# Patient Record
Sex: MALE | Race: WHITE | NOT HISPANIC OR LATINO | Employment: OTHER | URBAN - METROPOLITAN AREA
[De-identification: names, ages, dates, MRNs, and addresses within clinical notes are randomized per-mention and may not be internally consistent; named-entity substitution may affect disease eponyms.]

---

## 2024-09-05 ENCOUNTER — OFFICE VISIT (OUTPATIENT)
Dept: FAMILY MEDICINE | Facility: CLINIC | Age: 69
End: 2024-09-05
Payer: MEDICARE

## 2024-09-05 VITALS
BODY MASS INDEX: 30.79 KG/M2 | TEMPERATURE: 97 F | RESPIRATION RATE: 18 BRPM | HEART RATE: 75 BPM | HEIGHT: 69 IN | DIASTOLIC BLOOD PRESSURE: 72 MMHG | OXYGEN SATURATION: 96 % | SYSTOLIC BLOOD PRESSURE: 104 MMHG | WEIGHT: 207.88 LBS

## 2024-09-05 DIAGNOSIS — M62.89 QUADRICEP TIGHTNESS: Primary | ICD-10-CM

## 2024-09-05 PROCEDURE — 3078F DIAST BP <80 MM HG: CPT | Mod: CPTII,S$GLB,, | Performed by: INTERNAL MEDICINE

## 2024-09-05 PROCEDURE — 3008F BODY MASS INDEX DOCD: CPT | Mod: CPTII,S$GLB,, | Performed by: INTERNAL MEDICINE

## 2024-09-05 PROCEDURE — 1101F PT FALLS ASSESS-DOCD LE1/YR: CPT | Mod: CPTII,S$GLB,, | Performed by: INTERNAL MEDICINE

## 2024-09-05 PROCEDURE — 3074F SYST BP LT 130 MM HG: CPT | Mod: CPTII,S$GLB,, | Performed by: INTERNAL MEDICINE

## 2024-09-05 PROCEDURE — 1125F AMNT PAIN NOTED PAIN PRSNT: CPT | Mod: CPTII,S$GLB,, | Performed by: INTERNAL MEDICINE

## 2024-09-05 PROCEDURE — 99999 PR PBB SHADOW E&M-NEW PATIENT-LVL III: CPT | Mod: PBBFAC,,, | Performed by: INTERNAL MEDICINE

## 2024-09-05 PROCEDURE — 99203 OFFICE O/P NEW LOW 30 MIN: CPT | Mod: S$GLB,,, | Performed by: INTERNAL MEDICINE

## 2024-09-05 PROCEDURE — 1159F MED LIST DOCD IN RCRD: CPT | Mod: CPTII,S$GLB,, | Performed by: INTERNAL MEDICINE

## 2024-09-05 PROCEDURE — 3288F FALL RISK ASSESSMENT DOCD: CPT | Mod: CPTII,S$GLB,, | Performed by: INTERNAL MEDICINE

## 2024-09-05 RX ORDER — TIZANIDINE 4 MG/1
4 TABLET ORAL EVERY 6 HOURS PRN
Qty: 30 TABLET | Refills: 0 | Status: SHIPPED | OUTPATIENT
Start: 2024-09-05 | End: 2024-09-15

## 2024-09-05 RX ORDER — PRAVASTATIN SODIUM 40 MG/1
40 TABLET ORAL DAILY
COMMUNITY
Start: 2008-09-01

## 2024-09-05 RX ORDER — LISINOPRIL 40 MG/1
40 TABLET ORAL DAILY
COMMUNITY
Start: 2008-09-01

## 2024-09-05 NOTE — PROGRESS NOTES
Patient ID: Shalom Lizarraga is a 68 y.o. male.    Chief Complaint: Pain (Pain variates with activity/Started with back pain which started a week ago Tuesday /Not so much of the back pain but has pain from thighs on down. /Denies N/V )      History of Present Illness    The patient presents with severe leg pain that worsens with certain movements, particularly when bending over or transitioning between sitting and standing positions.    Mr. Lizarraga reports severe leg pain that began 9 days ago after straining his back while returning a propane grill to [facility]. The initial back pain persisted intermittently for 5-6 days. 5 days after the incident, the patient noticed a slight tingling sensation in his legs, initially attributed to an electrolyte imbalance due to heat. By day 6, the leg pain intensified and did not resolve with potassium, electrolyte drinks, or bananas. On day 7, the pain became significantly more severe, limiting daily activities and prompting the patient to take his wife's post-surgery pain medication.    The leg pain is sharp and severe, particularly when bending over, rising from a chair, or sitting down. It is localized primarily in the quadriceps area and exacerbated when the legs are positioned higher than the hips. Walking does not cause as much pain, but transitioning from standing to sitting is particularly painful. The patient reports difficulty bending over without significant pain.    The back pain has resolved, but the leg pain persists. The patient can sit comfortably with his legs at a 90-degree angle but has pain when his legs are elevated or when using his quadriceps muscles. The pain is severe enough to almost cause falls, not due to muscle weakness but because of its intensity.    Today, the patient was unable to bend down low enough to put a harness on their dachshund, a task he could manage yesterday, indicating progression in movement limitation.    Prior to the visit, the patient took  hydrocodone 10/325 mg, naproxen 500 mg, and cyclobenzaprine 5 mg to manage the pain.    The patient denies any current back pain, muscle weakness, or coordination issues when walking.      ROS:  General: denies fever, denies chills, denies fatigue, denies weight gain, denies weight loss  Eyes: denies vision changes, denies redness, denies discharge  ENT: denies ear pain, denies nasal congestion, denies sore throat  Cardiovascular: denies chest pain, denies palpitations, denies lower extremity edema  Respiratory: denies cough, denies shortness of breath  Gastrointestinal: denies abdominal pain, denies nausea, denies vomiting, denies diarrhea, denies constipation, denies blood in stool  Genitourinary: denies dysuria, denies hematuria, denies frequency  Musculoskeletal: complains of joint pain, denies muscle pain, complains of joint stiffness, denies back pain, denies muscle weakness  Skin: denies rash, denies lesion  Neurological: denies headache, denies dizziness, denies numbness, denies tingling  Psychiatric: denies anxiety, denies depression, denies sleep difficulty            Physical Exam    General: In no acute distress.  Head: Normocephalic. Non traumatic.  Eyes: PERRLA. EOMs full. Conjunctivae clear. Fundi grossly normal.  Ears: EACs clear. TMs normal.  Nose: Mucosa pink. Mucosa moist. No obstruction.  Throat: Clear. No exudates. No lesions.  Neck: Supple. No masses. No thyromegaly. No bruits.  Chest: Lungs clear. No rales. No rhonchi. No wheezes.  Heart: RRR. No murmurs. No rubs. No gallops.  Abdomen: Soft. No tenderness. No masses. BS normal.  : Normal external genitalia. No lesions. No discharge. No hernias  noted.  Back: Normal curvature. No scoliosis. No tenderness.  Extremities: Warm. Well perfused. No upper extremity edema. No lower extremity edema. FROM. No deformities. No joint erythema.  Neuro: No focal deficits appreciated. Good muscle tone. Normal response to visual stimuli. Normal response to  "auditory stimuli.  Skin: Normal. No rashes. No lesions noted.  Musculoskeletal: PAIN WITH CONTRACTION OF GLUTEAL MUSCLES.            Current Outpatient Medications:     lisinopriL (PRINIVIL,ZESTRIL) 40 MG tablet, Take 40 mg by mouth once daily., Disp: , Rfl:     pravastatin (PRAVACHOL) 40 MG tablet, Take 40 mg by mouth once daily., Disp: , Rfl:     tiZANidine (ZANAFLEX) 4 MG tablet, Take 1 tablet (4 mg total) by mouth every 6 (six) hours as needed., Disp: 30 tablet, Rfl: 0            VITAL SIGNS:  Vitals:    09/05/24 1325   BP: 104/72   BP Location: Left arm   Patient Position: Sitting   BP Method: Medium (Manual)   Pulse: 75   Resp: 18   Temp: 97.3 °F (36.3 °C)   TempSrc: Temporal   SpO2: 96%   Weight: 94.3 kg (207 lb 14.3 oz)   Height: 5' 9" (1.753 m)       CURRENT BMI:   Body mass index is 30.7 kg/m².    LABS REVIEWED:    CBC:  No results found for: "WBC", "RBC", "HGB", "HCT", "MCV", "MCH", "MCHC", "RDW", "PLT", "MPV", "GRAN", "LYMPH", "MONO", "EOS", "BASO", "EOSINOPHIL", "BASOPHIL"    CHEMISTRY:  No results found for: "NA", "K", "CL", "CO2", "GLU", "BUN", "CREATININE", "CALCIUM", "PROT", "ALBUMIN", "BILITOT", "ALKPHOS", "AST", "ALT", "ANIONGAP", "EGFRNORACEVR"    LIPID PANEL:  No results found for: "CHOL"  No results found for: "TRIG"  No results found for: "HDL"  No results found for: "LDLCALC"    THYROID:  No results found for: "TSH", "E5PIMRJ", "M6EWIMN", "THYROIDAB", "FREET4"    DIABETES:  No results found for: "LABA1C", "HGBA1C"  No results found for: "MICALBCREAT"    Assessment and Plan     Assessment & Plan    LEG PAIN:  - Assessed leg pain, noting full strength and coordinated gait.  - Ruled out bulging disc or neurological issues based on physical exam.  - Determined pain likely due to muscle strain from compensatory movements during recent back pain episode.  - Explained muscle pain feedback loop: pain causes muscle tightening, which perpetuates pain cycle.  - Discussed role of specific muscle groups " (glutes, quads, piriformis) in standing and sitting movements.  - Explained how slight changes in body mechanics can lead to muscle strain.    MEDICATIONS/SUPPLEMENTS:  - Will switch muscle relaxer from cyclobenzaprine to tizanidine (Zanaflex) for more targeted muscle relaxation.  - Anticipated 2-day period for medication effectiveness.  - Considered potential for steroid treatment if muscle relaxer proves ineffective.  - Started tizanidine (Zanaflex) as muscle relaxer.  - Continued naproxen 500 mg and hydrocodone/acetaminophen 10/325 mg.  - Discontinued cyclobenzaprine 5 mg.    FOLLOW UP:  - Contact the office by Monday if no improvement in symptoms.          1. Quadricep tightness  Comments:  Will prescribe Zanaflex.  Orders:  -     tiZANidine (ZANAFLEX) 4 MG tablet; Take 1 tablet (4 mg total) by mouth every 6 (six) hours as needed.  Dispense: 30 tablet; Refill: 0           No follow-ups on file.  32 of total time spent on the encounter, which includes face to face time and non-face to face time preparing to see the patient. This includes obtaining and/or reviewing separately obtained history, performing a medically appropriate examination and/or evaluation, and counseling and educating the patient/family/caregiver. Includes documenting clinical information in the electronic or other health record, independently interpreting results (not separately reported) and communicating results to the patient/family/caregiver, with care coordination (not separately reported). Medications, tests and/or procedures ordered as necessary along with referring and communicating with other health professionals (when not separately reported).      This note was generated with the assistance of ambient listening technology. Verbal consent was obtained by the patient and accompanying visitor(s) for the recording of patient appointment to facilitate this note. I attest to having reviewed and edited the generated note for accuracy,  though some syntax or spelling errors may persist. Please contact the author of this note for any clarification.

## 2024-09-09 ENCOUNTER — TELEPHONE (OUTPATIENT)
Dept: INTERNAL MEDICINE | Facility: CLINIC | Age: 69
End: 2024-09-09
Payer: MEDICARE

## 2024-09-09 ENCOUNTER — PATIENT MESSAGE (OUTPATIENT)
Dept: FAMILY MEDICINE | Facility: CLINIC | Age: 69
End: 2024-09-09
Payer: MEDICARE

## 2024-09-09 RX ORDER — METHYLPREDNISOLONE 4 MG/1
TABLET ORAL
Qty: 21 EACH | Refills: 0 | Status: SHIPPED | OUTPATIENT
Start: 2024-09-09

## 2024-09-09 NOTE — TELEPHONE ENCOUNTER
"Message below from Dr Gutierrez via secure chat    "this person is scheduled with me for Wednesday. pt should contact his pcp today if continuing with symptoms for direction on next steps. appear she saw him last week and her notes indicate she wanted to hear from him by Monday if continuing with symptoms. "    Pt states that he did contact his PCP today but would like to keep his appointment for Wednesday.   "

## 2024-09-12 ENCOUNTER — OFFICE VISIT (OUTPATIENT)
Dept: INTERNAL MEDICINE | Facility: CLINIC | Age: 69
End: 2024-09-12
Payer: MEDICARE

## 2024-09-12 ENCOUNTER — HOSPITAL ENCOUNTER (OUTPATIENT)
Dept: RADIOLOGY | Facility: HOSPITAL | Age: 69
Discharge: HOME | End: 2024-09-12
Attending: INTERNAL MEDICINE
Payer: MEDICARE

## 2024-09-12 VITALS
HEART RATE: 68 BPM | OXYGEN SATURATION: 98 % | TEMPERATURE: 97 F | DIASTOLIC BLOOD PRESSURE: 80 MMHG | HEIGHT: 69 IN | WEIGHT: 210.13 LBS | BODY MASS INDEX: 31.12 KG/M2 | SYSTOLIC BLOOD PRESSURE: 140 MMHG

## 2024-09-12 DIAGNOSIS — M62.89 QUADRICEP TIGHTNESS: ICD-10-CM

## 2024-09-12 DIAGNOSIS — M54.16 LUMBAR RADICULITIS: Primary | ICD-10-CM

## 2024-09-12 DIAGNOSIS — M54.16 LUMBAR RADICULITIS: ICD-10-CM

## 2024-09-12 PROCEDURE — 3077F SYST BP >= 140 MM HG: CPT | Mod: CPTII,S$GLB,, | Performed by: INTERNAL MEDICINE

## 2024-09-12 PROCEDURE — 99999 PR PBB SHADOW E&M-EST. PATIENT-LVL IV: CPT | Mod: PBBFAC,,, | Performed by: INTERNAL MEDICINE

## 2024-09-12 PROCEDURE — 72100 X-RAY EXAM L-S SPINE 2/3 VWS: CPT | Mod: 26,,, | Performed by: RADIOLOGY

## 2024-09-12 PROCEDURE — 1159F MED LIST DOCD IN RCRD: CPT | Mod: CPTII,S$GLB,, | Performed by: INTERNAL MEDICINE

## 2024-09-12 PROCEDURE — 3008F BODY MASS INDEX DOCD: CPT | Mod: CPTII,S$GLB,, | Performed by: INTERNAL MEDICINE

## 2024-09-12 PROCEDURE — 3079F DIAST BP 80-89 MM HG: CPT | Mod: CPTII,S$GLB,, | Performed by: INTERNAL MEDICINE

## 2024-09-12 PROCEDURE — 1125F AMNT PAIN NOTED PAIN PRSNT: CPT | Mod: CPTII,S$GLB,, | Performed by: INTERNAL MEDICINE

## 2024-09-12 PROCEDURE — 99214 OFFICE O/P EST MOD 30 MIN: CPT | Mod: S$GLB,,, | Performed by: INTERNAL MEDICINE

## 2024-09-12 PROCEDURE — 72100 X-RAY EXAM L-S SPINE 2/3 VWS: CPT | Mod: TC

## 2024-09-12 PROCEDURE — 3288F FALL RISK ASSESSMENT DOCD: CPT | Mod: CPTII,S$GLB,, | Performed by: INTERNAL MEDICINE

## 2024-09-12 PROCEDURE — 1101F PT FALLS ASSESS-DOCD LE1/YR: CPT | Mod: CPTII,S$GLB,, | Performed by: INTERNAL MEDICINE

## 2024-09-12 RX ORDER — TIZANIDINE 4 MG/1
4 TABLET ORAL EVERY 6 HOURS PRN
Qty: 30 TABLET | Refills: 0 | Status: SHIPPED | OUTPATIENT
Start: 2024-09-12 | End: 2024-09-22

## 2024-09-12 RX ORDER — GABAPENTIN 100 MG/1
CAPSULE ORAL
Qty: 90 CAPSULE | Refills: 0 | Status: SHIPPED | OUTPATIENT
Start: 2024-09-12

## 2024-09-12 NOTE — PROGRESS NOTES
"Subjective:      Patient ID: Shalom Lizarraga is a 68 y.o. male.    Chief Complaint: Establish Care    HPI    69 yo with There is no problem list on file for this patient.    Past Medical History:   Diagnosis Date    Hyperlipidemia     Hypertension      Here today c/o leg pain that began assoc with back pain suspected due to strain when lifting a propane tank.    He saw a primary care provider on 9/5.    9/5/24 history reviewed with patient:  "The patient presents with severe leg pain that worsens with certain movements, particularly when bending over or transitioning between sitting and standing positions.     Mr. Lizarraga reports severe leg pain that began 9 days ago after straining his back while returning a propane grill to [facility]. The initial back pain persisted intermittently for 5-6 days. 5 days after the incident, the patient noticed a slight tingling sensation in his legs, initially attributed to an electrolyte imbalance due to heat. By day 6, the leg pain intensified and did not resolve with potassium, electrolyte drinks, or bananas. On day 7, the pain became significantly more severe, limiting daily activities and prompting the patient to take his wife's post-surgery pain medication.     The leg pain is sharp and severe, particularly when bending over, rising from a chair, or sitting down. It is localized primarily in the quadriceps area and exacerbated when the legs are positioned higher than the hips. Walking does not cause as much pain, but transitioning from standing to sitting is particularly painful. The patient reports difficulty bending over without significant pain.     The back pain has resolved, but the leg pain persists. The patient can sit comfortably with his legs at a 90-degree angle but has pain when his legs are elevated or when using his quadriceps muscles. The pain is severe enough to almost cause falls, not due to muscle weakness but because of its intensity.     Today, the patient was unable " "to bend down low enough to put a harness on their dachshund, a task he could manage yesterday, indicating progression in movement limitation.     Prior to the visit, the patient took hydrocodone 10/325 mg, naproxen 500 mg, and cyclobenzaprine 5 mg to manage the pain.  The patient denies any current back pain, muscle weakness, or coordination issues when walking."    On his September 5th visit he was prescribed Zanaflex.  He did not feel improvement and he messaged the provider who then prescribed Medrol Dosepak on September 9th.    Prednisone has improved pain but not resolved. Continues with stiffness to back, quads, hamstrings.  Tingling in lower legs if stand or walk for longer periods of time.   Pain worsens with bending and twisting at lower back.   ROM is improved.   Sleeping better.   Increase in activities of daily living.   Pain and stiffness is now 50% improved.   No symptoms in his feet.     No upper extremity symptoms.     He is compliant with medrol pack.     No weakness. No b/b incont.     Review of Systems   Constitutional:  Negative for chills and fever.   HENT:  Negative for ear pain and sore throat.    Respiratory:  Negative for cough.    Cardiovascular:  Negative for chest pain.   Gastrointestinal:  Negative for abdominal pain and blood in stool.   Genitourinary:  Negative for dysuria and hematuria.   Neurological:  Negative for seizures and syncope.     Objective:   BP (!) 140/80 (BP Location: Left arm, Patient Position: Sitting, BP Method: Medium (Manual))   Pulse 68   Temp 96.7 °F (35.9 °C) (Oral)   Ht 5' 9" (1.753 m)   Wt 95.3 kg (210 lb 1.6 oz)   SpO2 98%   BMI 31.03 kg/m²     Physical Exam  Constitutional:       General: He is not in acute distress.     Appearance: He is well-developed.   HENT:      Head: Normocephalic and atraumatic.   Eyes:      Extraocular Movements: Extraocular movements intact.   Neck:      Thyroid: No thyromegaly.   Cardiovascular:      Rate and Rhythm: Normal " "rate and regular rhythm.   Pulmonary:      Breath sounds: Normal breath sounds. No wheezing or rales.   Abdominal:      General: Bowel sounds are normal.      Palpations: Abdomen is soft.      Tenderness: There is no abdominal tenderness.   Musculoskeletal:         General: No swelling.      Cervical back: Neck supple. No rigidity.   Lymphadenopathy:      Cervical: No cervical adenopathy.   Skin:     General: Skin is warm and dry.   Neurological:      Mental Status: He is alert and oriented to person, place, and time.      Motor: No weakness.      Gait: Gait normal.   Psychiatric:         Behavior: Behavior normal.     Pain reproduced to kee legs with squatting. Decrease rom at lower back due to pain.     No results found for: "WBC", "HGB", "HCT", "MCV", "PLT", "CHOL", "TRIG", "HDL", "LDLCALC", "ALT", "AST", "NA", "K", "CALCIUM", "CL", "CO2", "BUN", "CREATININE", "EGFRNORACEVR", "TSH", "PSA", "PSADIAG", "GLU", "HGBA1C", "MICROALBUR", "[id number]", "TOTALTESTOST", "BNP"       The ASCVD Risk score (Lena DK, et al., 2019) failed to calculate for the following reasons:    Cannot find a previous HDL lab    Cannot find a previous total cholesterol lab     Assessment:     1. Lumbar radiculitis    2. Quadricep tightness      Plan:   1. Lumbar radiculitis  -     X-Ray Lumbar Spine Ap And Lateral; Future; Expected date: 09/12/2024  -     Ambulatory referral/consult to Physical/Occupational Therapy; Future; Expected date: 09/19/2024  -     gabapentin (NEURONTIN) 100 MG capsule; 1 to 3 tabs po qhs for nerve pain.  Dispense: 90 capsule; Refill: 0    2. Quadricep tightness  -     Ambulatory referral/consult to Physical/Occupational Therapy; Future; Expected date: 09/19/2024  -     tiZANidine (ZANAFLEX) 4 MG tablet; Take 1 tablet (4 mg total) by mouth every 6 (six) hours as needed.  Dispense: 30 tablet; Refill: 0        There are no Patient Instructions on file for this visit.    Future Appointments   Date Time Provider " Department Center   9/26/2024 11:00 AM Carlitos Gutierrez MD [facility] [place]           Follow up in about 2 weeks (around 9/26/2024), or if symptoms worsen or fail to improve.

## 2024-09-16 ENCOUNTER — PATIENT MESSAGE (OUTPATIENT)
Dept: INTERNAL MEDICINE | Facility: CLINIC | Age: 69
End: 2024-09-16
Payer: MEDICARE

## 2024-09-26 ENCOUNTER — OFFICE VISIT (OUTPATIENT)
Dept: INTERNAL MEDICINE | Facility: CLINIC | Age: 69
End: 2024-09-26
Payer: MEDICARE

## 2024-09-26 VITALS
SYSTOLIC BLOOD PRESSURE: 142 MMHG | HEIGHT: 69 IN | TEMPERATURE: 98 F | WEIGHT: 211.63 LBS | DIASTOLIC BLOOD PRESSURE: 90 MMHG | BODY MASS INDEX: 31.34 KG/M2 | OXYGEN SATURATION: 96 % | HEART RATE: 59 BPM

## 2024-09-26 DIAGNOSIS — Z12.5 ENCOUNTER FOR SCREENING FOR MALIGNANT NEOPLASM OF PROSTATE: ICD-10-CM

## 2024-09-26 DIAGNOSIS — E78.49 OTHER HYPERLIPIDEMIA: ICD-10-CM

## 2024-09-26 DIAGNOSIS — Z00.00 ROUTINE GENERAL MEDICAL EXAMINATION AT A HEALTH CARE FACILITY: Primary | ICD-10-CM

## 2024-09-26 DIAGNOSIS — Z11.59 NEED FOR HEPATITIS C SCREENING TEST: ICD-10-CM

## 2024-09-26 DIAGNOSIS — R03.0 ELEVATED BP WITHOUT DIAGNOSIS OF HYPERTENSION: ICD-10-CM

## 2024-09-26 DIAGNOSIS — Z12.11 COLON CANCER SCREENING: ICD-10-CM

## 2024-09-26 DIAGNOSIS — Z11.4 ENCOUNTER FOR SCREENING FOR HIV: ICD-10-CM

## 2024-09-26 DIAGNOSIS — E78.5 HYPERLIPIDEMIA, UNSPECIFIED HYPERLIPIDEMIA TYPE: ICD-10-CM

## 2024-09-26 PROCEDURE — 99397 PER PM REEVAL EST PAT 65+ YR: CPT | Mod: S$GLB,,, | Performed by: INTERNAL MEDICINE

## 2024-09-26 PROCEDURE — 3008F BODY MASS INDEX DOCD: CPT | Mod: CPTII,S$GLB,, | Performed by: INTERNAL MEDICINE

## 2024-09-26 PROCEDURE — 1159F MED LIST DOCD IN RCRD: CPT | Mod: CPTII,S$GLB,, | Performed by: INTERNAL MEDICINE

## 2024-09-26 PROCEDURE — 3079F DIAST BP 80-89 MM HG: CPT | Mod: CPTII,S$GLB,, | Performed by: INTERNAL MEDICINE

## 2024-09-26 PROCEDURE — 3077F SYST BP >= 140 MM HG: CPT | Mod: CPTII,S$GLB,, | Performed by: INTERNAL MEDICINE

## 2024-09-26 PROCEDURE — 99999 PR PBB SHADOW E&M-EST. PATIENT-LVL III: CPT | Mod: PBBFAC,,, | Performed by: INTERNAL MEDICINE

## 2024-09-26 PROCEDURE — 1126F AMNT PAIN NOTED NONE PRSNT: CPT | Mod: CPTII,S$GLB,, | Performed by: INTERNAL MEDICINE

## 2024-09-26 PROCEDURE — 3288F FALL RISK ASSESSMENT DOCD: CPT | Mod: CPTII,S$GLB,, | Performed by: INTERNAL MEDICINE

## 2024-09-26 PROCEDURE — 1101F PT FALLS ASSESS-DOCD LE1/YR: CPT | Mod: CPTII,S$GLB,, | Performed by: INTERNAL MEDICINE

## 2024-09-26 RX ORDER — PRAVASTATIN SODIUM 40 MG/1
40 TABLET ORAL DAILY
Qty: 90 TABLET | Refills: 3 | Status: SHIPPED | OUTPATIENT
Start: 2024-09-26 | End: 2025-09-26

## 2024-09-26 NOTE — PROGRESS NOTES
Subjective:      Patient ID: Shalom Lizarraga is a 68 y.o. male.    Chief Complaint: Follow-up    Follow-up  Pertinent negatives include no abdominal pain, chest pain, chills, coughing, fever or sore throat.         68 y.o. with  Patient Active Problem List   Diagnosis    Routine general medical examination at a health care facility    Other hyperlipidemia     Past Medical History:   Diagnosis Date    Hyperlipidemia     Hypertension        Here today for annual prev exam.  Compliant with meds without significant side effects. Energy and appetite are good.     Home bp systolic 118/74.     No more debilitating pain.   Tightness resolved  Mild pain in lower ext when stand after sitting for a while but resolves with walking.   Advised pt to schedule with PT    History reviewed. No pertinent surgical history.  Social History     Socioeconomic History    Marital status:    Tobacco Use    Smoking status: Never    Smokeless tobacco: Never   Substance and Sexual Activity    Alcohol use: Yes     Alcohol/week: 9.0 standard drinks of alcohol     Types: 9 Glasses of wine per week    Drug use: Never    Sexual activity: Not Currently     Partners: Female     Birth control/protection: None     Social Drivers of Health     Financial Resource Strain: Low Risk  (9/5/2024)    Overall Financial Resource Strain (CARDIA)     Difficulty of Paying Living Expenses: Not very hard   Food Insecurity: No Food Insecurity (9/5/2024)    Hunger Vital Sign     Worried About Running Out of Food in the Last Year: Never true     Ran Out of Food in the Last Year: Never true   Physical Activity: Insufficiently Active (9/5/2024)    Exercise Vital Sign     Days of Exercise per Week: 3 days     Minutes of Exercise per Session: 40 min   Stress: No Stress Concern Present (9/5/2024)    [employer] [place] [employer] - Occupational Stress Questionnaire     Feeling of Stress : Only a little   Housing Stability: Unknown (9/5/2024)    Housing Stability  "Vital Sign     Unable to Pay for Housing in the Last Year: No     family history includes Cancer in his mother; Heart disease in his father; Hyperlipidemia in his father; Hypertension in his father.    Review of Systems   Constitutional:  Negative for chills and fever.   HENT:  Negative for ear pain and sore throat.    Respiratory:  Negative for cough.    Cardiovascular:  Negative for chest pain.   Gastrointestinal:  Negative for abdominal pain and blood in stool.   Genitourinary:  Negative for dysuria and hematuria.   Neurological:  Negative for seizures and syncope.     Objective:   BP (!) 142/90 (BP Location: Left arm, Patient Position: Sitting, BP Method: Large (Manual))   Pulse (!) 59   Temp 98 °F (36.7 °C) (Tympanic)   Ht 5' 9" (1.753 m)   Wt 96 kg (211 lb 10.3 oz)   SpO2 96%   BMI 31.25 kg/m²     Physical Exam  Constitutional:       General: He is awake.      Appearance: Normal appearance.   HENT:      Head: Normocephalic and atraumatic.   Eyes:      Conjunctiva/sclera: Conjunctivae normal.   Pulmonary:      Effort: Pulmonary effort is normal.   Musculoskeletal:      Cervical back: Normal range of motion.   Neurological:      Mental Status: He is alert. Mental status is at baseline.   Psychiatric:         Mood and Affect: Mood normal.         Behavior: Behavior normal. Behavior is cooperative.         Thought Content: Thought content normal.         Judgment: Judgment normal.         No results found for: "WBC", "HGB", "HCT", "MCV", "PLT", "CHOL", "TRIG", "HDL", "LDLCALC", "ALT", "AST", "NA", "K", "CALCIUM", "CL", "CO2", "BUN", "CREATININE", "EGFRNORACEVR", "TSH", "PSA", "PSADIAG", "GLU", "HGBA1C", "MICROALBUR", "[id number]", "TOTALTESTOST", "BNP"       The ASCVD Risk score (Lena DK, et al., 2019) failed to calculate for the following reasons:    Cannot find a previous HDL lab    Cannot find a previous total cholesterol lab     Assessment:     1. Routine general medical examination at a [facility] " facility    2. Encounter for screening for malignant neoplasm of prostate    3. Colon cancer screening    4. Need for hepatitis C screening test    5. Encounter for screening for HIV    6. Elevated BP without diagnosis of hypertension    7. Hyperlipidemia, unspecified hyperlipidemia type    8. Other hyperlipidemia      Plan:   1. Routine general medical examination at a health care facility  Overview:  Heart healthy diet, regular exercise, and regular use of sunscreen.    reviewed  Reports pneumonia vaccine completed in 2024.      2. Encounter for screening for malignant neoplasm of prostate  -     Cancel: PSA, Screening; Future; Expected date: 09/26/2024    3. Colon cancer screening    4. Need for hepatitis C screening test  -     Cancel: Hepatitis C Antibody; Future; Expected date: 09/26/2024    5. Encounter for screening for HIV  -     Cancel: HIV 1/2 Ag/Ab (4th Gen); Future; Expected date: 09/26/2024    6. Elevated BP without diagnosis of hypertension    7. Hyperlipidemia, unspecified hyperlipidemia type  -     Cancel: Comprehensive Metabolic Panel; Future; Expected date: 09/26/2024  -     Cancel: CBC Auto Differential; Future; Expected date: 09/26/2024  -     Cancel: TSH; Future; Expected date: 09/26/2024  -     Cancel: Lipid Panel; Future; Expected date: 09/26/2024  -     pravastatin (PRAVACHOL) 40 MG tablet; Take 1 tablet (40 mg total) by mouth once daily.  Dispense: 90 tablet; Refill: 3    8. Other hyperlipidemia  Overview:  Reports       Pt declined labs. Reports had labs recently and will provide.     Patient Instructions   Check with your pharmacy regarding shingles vaccine.    Check with your pharmacy regarding RSV vaccine.    Check with your pharmacy regarding covid vaccine.      Future Appointments   Date Time Provider Department Center   10/3/2024  2:00 PM Jodie Esparza PT [facility] [place]   1/9/2025  8:40 AM Carlitos Gutierrez MD [facility] [place]         Lab Frequency Next Occurrence    Ambulatory referral/consult to Physical/Occupational Therapy Once 09/19/2024       Follow up in about 3 months (around 1/6/2025), or if symptoms worsen or fail to improve.

## 2024-09-26 NOTE — PATIENT INSTRUCTIONS
Check with your pharmacy regarding shingles vaccine.    Check with your pharmacy regarding RSV vaccine.    Check with your pharmacy regarding covid vaccine.

## 2024-10-15 ENCOUNTER — PATIENT MESSAGE (OUTPATIENT)
Dept: INTERNAL MEDICINE | Facility: CLINIC | Age: 69
End: 2024-10-15
Payer: MEDICARE

## 2024-10-15 ENCOUNTER — PATIENT MESSAGE (OUTPATIENT)
Dept: RESEARCH | Facility: HOSPITAL | Age: 69
End: 2024-10-15
Payer: MEDICARE

## 2025-01-09 ENCOUNTER — OFFICE VISIT (OUTPATIENT)
Dept: INTERNAL MEDICINE | Facility: CLINIC | Age: 70
End: 2025-01-09
Payer: MEDICARE

## 2025-01-09 VITALS
DIASTOLIC BLOOD PRESSURE: 82 MMHG | WEIGHT: 215.38 LBS | BODY MASS INDEX: 31.9 KG/M2 | HEART RATE: 65 BPM | OXYGEN SATURATION: 97 % | SYSTOLIC BLOOD PRESSURE: 136 MMHG | TEMPERATURE: 98 F | HEIGHT: 69 IN

## 2025-01-09 DIAGNOSIS — Z11.59 NEED FOR HEPATITIS C SCREENING TEST: ICD-10-CM

## 2025-01-09 DIAGNOSIS — E78.5 HYPERLIPIDEMIA, UNSPECIFIED HYPERLIPIDEMIA TYPE: ICD-10-CM

## 2025-01-09 DIAGNOSIS — Z12.5 ENCOUNTER FOR SCREENING FOR MALIGNANT NEOPLASM OF PROSTATE: ICD-10-CM

## 2025-01-09 DIAGNOSIS — I10 PRIMARY HYPERTENSION: ICD-10-CM

## 2025-01-09 DIAGNOSIS — Z82.49 FAMILY HISTORY OF HEART DISEASE: ICD-10-CM

## 2025-01-09 DIAGNOSIS — I10 HYPERTENSION, UNSPECIFIED TYPE: ICD-10-CM

## 2025-01-09 DIAGNOSIS — E78.49 OTHER HYPERLIPIDEMIA: Primary | ICD-10-CM

## 2025-01-09 DIAGNOSIS — Z00.00 PREVENTATIVE HEALTH CARE: ICD-10-CM

## 2025-01-09 PROCEDURE — 1126F AMNT PAIN NOTED NONE PRSNT: CPT | Mod: CPTII,S$GLB,, | Performed by: INTERNAL MEDICINE

## 2025-01-09 PROCEDURE — 3008F BODY MASS INDEX DOCD: CPT | Mod: CPTII,S$GLB,, | Performed by: INTERNAL MEDICINE

## 2025-01-09 PROCEDURE — 99999 PR PBB SHADOW E&M-EST. PATIENT-LVL III: CPT | Mod: PBBFAC,,, | Performed by: INTERNAL MEDICINE

## 2025-01-09 PROCEDURE — 3075F SYST BP GE 130 - 139MM HG: CPT | Mod: CPTII,S$GLB,, | Performed by: INTERNAL MEDICINE

## 2025-01-09 PROCEDURE — 99214 OFFICE O/P EST MOD 30 MIN: CPT | Mod: S$GLB,,, | Performed by: INTERNAL MEDICINE

## 2025-01-09 PROCEDURE — 1101F PT FALLS ASSESS-DOCD LE1/YR: CPT | Mod: CPTII,S$GLB,, | Performed by: INTERNAL MEDICINE

## 2025-01-09 PROCEDURE — 3288F FALL RISK ASSESSMENT DOCD: CPT | Mod: CPTII,S$GLB,, | Performed by: INTERNAL MEDICINE

## 2025-01-09 PROCEDURE — 1159F MED LIST DOCD IN RCRD: CPT | Mod: CPTII,S$GLB,, | Performed by: INTERNAL MEDICINE

## 2025-01-09 PROCEDURE — 4010F ACE/ARB THERAPY RXD/TAKEN: CPT | Mod: CPTII,S$GLB,, | Performed by: INTERNAL MEDICINE

## 2025-01-09 PROCEDURE — G2211 COMPLEX E/M VISIT ADD ON: HCPCS | Mod: S$GLB,,, | Performed by: INTERNAL MEDICINE

## 2025-01-09 PROCEDURE — 3079F DIAST BP 80-89 MM HG: CPT | Mod: CPTII,S$GLB,, | Performed by: INTERNAL MEDICINE

## 2025-01-09 RX ORDER — PRAVASTATIN SODIUM 40 MG/1
40 TABLET ORAL DAILY
Qty: 90 TABLET | Refills: 3 | Status: SHIPPED | OUTPATIENT
Start: 2025-01-09 | End: 2026-01-09

## 2025-01-09 RX ORDER — LISINOPRIL 40 MG/1
40 TABLET ORAL DAILY
Qty: 90 TABLET | Refills: 1 | Status: SHIPPED | OUTPATIENT
Start: 2025-01-09 | End: 2026-01-09

## 2025-01-09 NOTE — PROGRESS NOTES
"Subjective:      Patient ID: Shalom Lizarraga is a 69 y.o. male.    Chief Complaint: Follow-up    Follow-up  Pertinent negatives include no abdominal pain, chest pain, chills, coughing, fever or sore throat.       68 yo with   Patient Active Problem List   Diagnosis    Routine general medical examination at a health care facility    Other hyperlipidemia    Family history of heart disease    Primary hypertension     Past Medical History:   Diagnosis Date    Hyperlipidemia     Hypertension      Here today for f/u on htn and hyperlipidemia    Home bp 120s/ 70s.       Review of Systems   Constitutional:  Negative for chills and fever.   HENT:  Negative for ear pain and sore throat.    Respiratory:  Negative for cough.    Cardiovascular:  Negative for chest pain.   Gastrointestinal:  Negative for abdominal pain and blood in stool.   Genitourinary:  Negative for dysuria and hematuria.   Neurological:  Negative for seizures and syncope.     Objective:   /82   Pulse 65   Temp 98 °F (36.7 °C) (Oral)   Ht 5' 9" (1.753 m)   Wt 97.7 kg (215 lb 6.2 oz)   SpO2 97%   BMI 31.81 kg/m²     Physical Exam  Constitutional:       General: He is not in acute distress.     Appearance: He is well-developed.   HENT:      Head: Normocephalic and atraumatic.   Eyes:      Extraocular Movements: Extraocular movements intact.   Neck:      Thyroid: No thyromegaly.   Cardiovascular:      Rate and Rhythm: Normal rate and regular rhythm.   Pulmonary:      Breath sounds: Normal breath sounds. No wheezing or rales.   Abdominal:      General: Bowel sounds are normal.      Palpations: Abdomen is soft.      Tenderness: There is no abdominal tenderness.   Musculoskeletal:         General: No swelling.      Cervical back: Neck supple. No rigidity.   Lymphadenopathy:      Cervical: No cervical adenopathy.   Skin:     General: Skin is warm and dry.   Neurological:      Mental Status: He is alert and oriented to person, place, and time.   Psychiatric:    " "     Behavior: Behavior normal.         No results found for: "WBC", "HGB", "HCT", "MCV", "PLT", "CHOL", "TRIG", "HDL", "LDLCALC", "ALT", "AST", "NA", "K", "CALCIUM", "CL", "CO2", "BUN", "CREATININE", "EGFRNORACEVR", "TSH", "PSA", "PSADIAG", "GLU", "HGBA1C", "MICROALBUR", "[id number]", "TOTALTESTOST", "BNP"       The ASCVD Risk score (Lena DK, et al., 2019) failed to calculate for the following reasons:    Cannot find a previous HDL lab    Cannot find a previous total cholesterol lab     Assessment:     1. Other hyperlipidemia    2. Hyperlipidemia, unspecified hyperlipidemia type    3. Family history of heart disease    4. Hypertension, unspecified type    5. Preventative health care    6. Primary hypertension    7. Encounter for screening for malignant neoplasm of prostate    8. Need for hepatitis C screening test      Plan:   1. Other hyperlipidemia  Overview:  Reports on pravastatin since around 53 yo.     Reports calcium score of 0 in his mid 50s.     Orders:  -     Comprehensive Metabolic Panel; Future; Expected date: 08/04/2025  -     CBC Auto Differential; Future; Expected date: 08/04/2025  -     TSH; Future; Expected date: 08/04/2025  -     Lipid Panel; Future; Expected date: 08/04/2025    2. Hyperlipidemia, unspecified hyperlipidemia type  -     pravastatin (PRAVACHOL) 40 MG tablet; Take 1 tablet (40 mg total) by mouth once daily.  Dispense: 90 tablet; Refill: 3    3. Family history of heart disease  Overview:  Father with CABG at 85.       4. Hypertension, unspecified type  -     lisinopriL (PRINIVIL,ZESTRIL) 40 MG tablet; Take 1 tablet (40 mg total) by mouth once daily.  Dispense: 90 tablet; Refill: 1    5. Preventative health care    6. Primary hypertension    7. Encounter for screening for malignant neoplasm of prostate  -     PSA, Screening; Future; Expected date: 08/04/2025    8. Need for hepatitis C screening test  -     Hepatitis C Antibody; Future; Expected date: 08/04/2025        Patient " Instructions   Check with your pharmacy regarding new shingles vaccine.      No future appointments.    Lab Frequency Next Occurrence   Ambulatory referral/consult to Physical/Occupational Therapy Once 09/19/2024       Follow up in about 7 months (around 8/11/2025), or if symptoms worsen or fail to improve.           Visit today included increased complexity  addressed and managing the longitudinal care of the patient due to the serious and/or complex managed problem(s)

## 2025-07-21 DIAGNOSIS — I10 HYPERTENSION, UNSPECIFIED TYPE: ICD-10-CM

## 2025-07-21 RX ORDER — LISINOPRIL 40 MG/1
40 TABLET ORAL DAILY
Qty: 90 TABLET | Refills: 0 | Status: SHIPPED | OUTPATIENT
Start: 2025-07-21 | End: 2025-10-19

## 2025-07-21 NOTE — TELEPHONE ENCOUNTER
Care Due:                  Date            Visit Type   Department     Provider  --------------------------------------------------------------------------------                                EP -                              PRIMARY      [facility]  Last Visit: 01-      CARE ([facility])   MULU Gutierrez                              EP -                              PRIMARY      [facility]  Next Visit: 08-      CARE ([facility])   MULU Gutierrez                                                            Last  Test          Frequency    Reason                     Performed    Due Date  --------------------------------------------------------------------------------    CMP.........  12 months..  lisinopriL, pravastatin..  Not Found    Overdue    Lipid Panel.  12 months..  pravastatin..............  Not Found    Overdue    [employer] Embedded Care Due Messages. Reference number: [id number].   7/21/2025 11:08:41 AM CDT